# Patient Record
Sex: FEMALE | Race: WHITE | NOT HISPANIC OR LATINO | URBAN - METROPOLITAN AREA
[De-identification: names, ages, dates, MRNs, and addresses within clinical notes are randomized per-mention and may not be internally consistent; named-entity substitution may affect disease eponyms.]

---

## 2017-09-28 ENCOUNTER — AMBULATORY - HEALTHEAST (OUTPATIENT)
Dept: ADMINISTRATIVE | Facility: CLINIC | Age: 82
End: 2017-09-28

## 2017-09-28 RX ORDER — POLYETHYLENE GLYCOL 3350 17 G/17G
17 POWDER, FOR SOLUTION ORAL DAILY PRN
Status: SHIPPED | COMMUNITY
Start: 2017-09-28

## 2017-09-28 RX ORDER — METHADONE HYDROCHLORIDE 5 MG/1
2.5 TABLET ORAL 3 TIMES DAILY
Status: SHIPPED | COMMUNITY
Start: 2017-09-28

## 2017-09-28 RX ORDER — AMOXICILLIN 250 MG
2 CAPSULE ORAL 2 TIMES DAILY
Status: SHIPPED | COMMUNITY
Start: 2017-09-28

## 2017-09-28 RX ORDER — OXYCODONE HYDROCHLORIDE 10 MG/1
10 TABLET ORAL 2 TIMES DAILY
Status: SHIPPED | COMMUNITY
Start: 2017-09-28

## 2017-09-28 RX ORDER — DIVALPROEX SODIUM 125 MG/1
375 CAPSULE, COATED PELLETS ORAL 2 TIMES DAILY
Status: SHIPPED | COMMUNITY
Start: 2017-09-28

## 2017-09-28 RX ORDER — QUETIAPINE FUMARATE 50 MG/1
50 TABLET, FILM COATED ORAL 3 TIMES DAILY
Status: SHIPPED | COMMUNITY
Start: 2017-09-28

## 2017-09-28 RX ORDER — OXYCODONE HYDROCHLORIDE 10 MG/1
10 TABLET ORAL
Status: SHIPPED | COMMUNITY
Start: 2017-09-28

## 2017-09-28 RX ORDER — BISACODYL 10 MG
10 SUPPOSITORY, RECTAL RECTAL
Status: SHIPPED | COMMUNITY
Start: 2017-09-28

## 2017-09-28 RX ORDER — PAROXETINE 30 MG/1
30 TABLET, FILM COATED ORAL DAILY
Status: SHIPPED | COMMUNITY
Start: 2017-09-28

## 2017-09-29 ENCOUNTER — OFFICE VISIT - HEALTHEAST (OUTPATIENT)
Dept: GERIATRICS | Facility: CLINIC | Age: 82
End: 2017-09-29

## 2017-09-29 DIAGNOSIS — M06.9 RA (RHEUMATOID ARTHRITIS) (H): ICD-10-CM

## 2017-09-29 DIAGNOSIS — F03.90 DEMENTIA (H): ICD-10-CM

## 2017-09-29 DIAGNOSIS — E03.9 HYPOTHYROIDISM: ICD-10-CM

## 2017-09-29 DIAGNOSIS — M19.90 DJD (DEGENERATIVE JOINT DISEASE): ICD-10-CM

## 2017-09-29 DIAGNOSIS — I10 ESSENTIAL HYPERTENSION: ICD-10-CM

## 2017-10-23 ENCOUNTER — OFFICE VISIT - HEALTHEAST (OUTPATIENT)
Dept: GERIATRICS | Facility: CLINIC | Age: 82
End: 2017-10-23

## 2017-10-23 DIAGNOSIS — M19.90 DJD (DEGENERATIVE JOINT DISEASE): ICD-10-CM

## 2017-10-23 DIAGNOSIS — I10 ESSENTIAL HYPERTENSION: ICD-10-CM

## 2017-10-23 DIAGNOSIS — F03.90 DEMENTIA (H): ICD-10-CM

## 2017-10-23 DIAGNOSIS — E03.9 HYPOTHYROIDISM: ICD-10-CM

## 2017-10-23 DIAGNOSIS — M06.9 RA (RHEUMATOID ARTHRITIS) (H): ICD-10-CM

## 2017-11-24 ENCOUNTER — OFFICE VISIT - HEALTHEAST (OUTPATIENT)
Dept: GERIATRICS | Facility: CLINIC | Age: 82
End: 2017-11-24

## 2017-11-24 DIAGNOSIS — M19.90 DJD (DEGENERATIVE JOINT DISEASE): ICD-10-CM

## 2017-11-24 DIAGNOSIS — I10 ESSENTIAL HYPERTENSION: ICD-10-CM

## 2017-11-24 DIAGNOSIS — F03.90 DEMENTIA (H): ICD-10-CM

## 2017-11-24 DIAGNOSIS — E03.9 HYPOTHYROIDISM: ICD-10-CM

## 2017-12-04 ENCOUNTER — OFFICE VISIT - HEALTHEAST (OUTPATIENT)
Dept: GERIATRICS | Facility: CLINIC | Age: 82
End: 2017-12-04

## 2017-12-04 DIAGNOSIS — I10 ESSENTIAL HYPERTENSION: ICD-10-CM

## 2017-12-04 DIAGNOSIS — E03.9 HYPOTHYROIDISM: ICD-10-CM

## 2017-12-04 DIAGNOSIS — M06.9 RA (RHEUMATOID ARTHRITIS) (H): ICD-10-CM

## 2017-12-04 DIAGNOSIS — F03.90 DEMENTIA (H): ICD-10-CM

## 2018-01-10 ENCOUNTER — OFFICE VISIT - HEALTHEAST (OUTPATIENT)
Dept: GERIATRICS | Facility: CLINIC | Age: 83
End: 2018-01-10

## 2018-01-10 DIAGNOSIS — F03.90 DEMENTIA (H): ICD-10-CM

## 2018-01-10 DIAGNOSIS — I10 ESSENTIAL HYPERTENSION: ICD-10-CM

## 2018-01-10 DIAGNOSIS — E03.9 HYPOTHYROIDISM: ICD-10-CM

## 2018-01-10 DIAGNOSIS — M06.9 RA (RHEUMATOID ARTHRITIS) (H): ICD-10-CM

## 2018-01-10 DIAGNOSIS — M19.90 DJD (DEGENERATIVE JOINT DISEASE): ICD-10-CM

## 2018-01-16 ENCOUNTER — RECORDS - HEALTHEAST (OUTPATIENT)
Dept: LAB | Facility: CLINIC | Age: 83
End: 2018-01-16

## 2018-01-16 LAB
CREAT SERPL-MCNC: 0.69 MG/DL (ref 0.6–1.1)
GFR SERPL CREATININE-BSD FRML MDRD: >60 ML/MIN/1.73M2

## 2018-03-01 ENCOUNTER — RECORDS - HEALTHEAST (OUTPATIENT)
Dept: LAB | Facility: CLINIC | Age: 83
End: 2018-03-01

## 2018-03-02 LAB
BASOPHILS # BLD AUTO: 0 THOU/UL (ref 0–0.2)
BASOPHILS NFR BLD AUTO: 0 % (ref 0–2)
EOSINOPHIL # BLD AUTO: 0.7 THOU/UL (ref 0–0.4)
EOSINOPHIL NFR BLD AUTO: 8 % (ref 0–6)
ERYTHROCYTE [DISTWIDTH] IN BLOOD BY AUTOMATED COUNT: 12.2 % (ref 11–14.5)
HCT VFR BLD AUTO: 35.9 % (ref 35–47)
HGB BLD-MCNC: 11.8 G/DL (ref 12–16)
LYMPHOCYTES # BLD AUTO: 2.7 THOU/UL (ref 0.8–4.4)
LYMPHOCYTES NFR BLD AUTO: 29 % (ref 20–40)
MCH RBC QN AUTO: 35.3 PG (ref 27–34)
MCHC RBC AUTO-ENTMCNC: 32.9 G/DL (ref 32–36)
MCV RBC AUTO: 108 FL (ref 80–100)
MONOCYTES # BLD AUTO: 0.8 THOU/UL (ref 0–0.9)
MONOCYTES NFR BLD AUTO: 9 % (ref 2–10)
NEUTROPHILS # BLD AUTO: 5.1 THOU/UL (ref 2–7.7)
NEUTROPHILS NFR BLD AUTO: 55 % (ref 50–70)
PLATELET # BLD AUTO: 203 THOU/UL (ref 140–440)
PMV BLD AUTO: 10 FL (ref 8.5–12.5)
RBC # BLD AUTO: 3.34 MILL/UL (ref 3.8–5.4)
WBC: 9.4 THOU/UL (ref 4–11)

## 2018-03-15 ENCOUNTER — AMBULATORY - HEALTHEAST (OUTPATIENT)
Dept: GERIATRICS | Facility: CLINIC | Age: 83
End: 2018-03-15

## 2018-03-30 ENCOUNTER — OFFICE VISIT - HEALTHEAST (OUTPATIENT)
Dept: GERIATRICS | Facility: CLINIC | Age: 83
End: 2018-03-30

## 2018-03-30 DIAGNOSIS — E03.9 HYPOTHYROIDISM: ICD-10-CM

## 2018-03-30 DIAGNOSIS — I10 ESSENTIAL HYPERTENSION: ICD-10-CM

## 2018-03-30 DIAGNOSIS — M19.90 DJD (DEGENERATIVE JOINT DISEASE): ICD-10-CM

## 2018-03-30 DIAGNOSIS — F03.90 DEMENTIA (H): ICD-10-CM

## 2018-05-02 ENCOUNTER — OFFICE VISIT - HEALTHEAST (OUTPATIENT)
Dept: GERIATRICS | Facility: CLINIC | Age: 83
End: 2018-05-02

## 2018-05-02 DIAGNOSIS — F03.90 DEMENTIA (H): ICD-10-CM

## 2018-05-02 DIAGNOSIS — M06.9 RA (RHEUMATOID ARTHRITIS) (H): ICD-10-CM

## 2018-05-02 DIAGNOSIS — E03.9 HYPOTHYROIDISM: ICD-10-CM

## 2018-05-02 DIAGNOSIS — I10 ESSENTIAL HYPERTENSION: ICD-10-CM

## 2018-05-02 DIAGNOSIS — M19.90 DJD (DEGENERATIVE JOINT DISEASE): ICD-10-CM

## 2018-05-24 ENCOUNTER — RECORDS - HEALTHEAST (OUTPATIENT)
Dept: LAB | Facility: CLINIC | Age: 83
End: 2018-05-24

## 2018-05-25 LAB — SODIUM SERPL-SCNC: 138 MMOL/L (ref 136–145)

## 2018-06-10 ENCOUNTER — COMMUNICATION - HEALTHEAST (OUTPATIENT)
Dept: GERIATRICS | Facility: CLINIC | Age: 83
End: 2018-06-10

## 2018-06-10 DIAGNOSIS — R52 PAIN: ICD-10-CM

## 2018-07-02 ENCOUNTER — OFFICE VISIT - HEALTHEAST (OUTPATIENT)
Dept: GERIATRICS | Facility: CLINIC | Age: 83
End: 2018-07-02

## 2018-07-02 DIAGNOSIS — M06.9 RA (RHEUMATOID ARTHRITIS) (H): ICD-10-CM

## 2018-07-02 DIAGNOSIS — I10 ESSENTIAL HYPERTENSION: ICD-10-CM

## 2018-07-02 DIAGNOSIS — F03.90 DEMENTIA (H): ICD-10-CM

## 2018-07-02 DIAGNOSIS — S00.01XA SCALP ABRASION: ICD-10-CM

## 2018-07-02 DIAGNOSIS — M19.90 DJD (DEGENERATIVE JOINT DISEASE): ICD-10-CM

## 2018-07-31 ENCOUNTER — OFFICE VISIT - HEALTHEAST (OUTPATIENT)
Dept: GERIATRICS | Facility: CLINIC | Age: 83
End: 2018-07-31

## 2018-07-31 DIAGNOSIS — I10 ESSENTIAL HYPERTENSION: ICD-10-CM

## 2018-07-31 DIAGNOSIS — M19.90 DJD (DEGENERATIVE JOINT DISEASE): ICD-10-CM

## 2018-07-31 DIAGNOSIS — F03.90 DEMENTIA (H): ICD-10-CM

## 2018-07-31 DIAGNOSIS — E03.9 HYPOTHYROIDISM: ICD-10-CM

## 2018-08-21 ENCOUNTER — COMMUNICATION - HEALTHEAST (OUTPATIENT)
Dept: GERIATRICS | Facility: CLINIC | Age: 83
End: 2018-08-21

## 2018-08-22 ENCOUNTER — OFFICE VISIT - HEALTHEAST (OUTPATIENT)
Dept: GERIATRICS | Facility: CLINIC | Age: 83
End: 2018-08-22

## 2018-08-22 DIAGNOSIS — M06.9 RA (RHEUMATOID ARTHRITIS) (H): ICD-10-CM

## 2018-08-22 DIAGNOSIS — F03.90 DEMENTIA (H): ICD-10-CM

## 2018-08-22 DIAGNOSIS — Z48.02 REMOVAL OF STAPLE: ICD-10-CM

## 2018-08-22 DIAGNOSIS — I10 ESSENTIAL HYPERTENSION: ICD-10-CM

## 2018-08-27 ENCOUNTER — COMMUNICATION - HEALTHEAST (OUTPATIENT)
Dept: GERIATRICS | Facility: CLINIC | Age: 83
End: 2018-08-27

## 2018-09-16 ENCOUNTER — COMMUNICATION - HEALTHEAST (OUTPATIENT)
Dept: GERIATRICS | Facility: CLINIC | Age: 83
End: 2018-09-16

## 2018-10-03 ENCOUNTER — OFFICE VISIT - HEALTHEAST (OUTPATIENT)
Dept: GERIATRICS | Facility: CLINIC | Age: 83
End: 2018-10-03

## 2018-10-03 DIAGNOSIS — F03.90 DEMENTIA (H): ICD-10-CM

## 2018-10-03 DIAGNOSIS — E03.9 HYPOTHYROIDISM: ICD-10-CM

## 2018-10-03 DIAGNOSIS — I10 ESSENTIAL HYPERTENSION: ICD-10-CM

## 2018-10-03 DIAGNOSIS — M06.9 RA (RHEUMATOID ARTHRITIS) (H): ICD-10-CM

## 2018-10-05 ENCOUNTER — RECORDS - HEALTHEAST (OUTPATIENT)
Dept: LAB | Facility: CLINIC | Age: 83
End: 2018-10-05

## 2018-10-05 ENCOUNTER — COMMUNICATION - HEALTHEAST (OUTPATIENT)
Dept: GERIATRICS | Facility: CLINIC | Age: 83
End: 2018-10-05

## 2018-10-05 LAB — TSH SERPL DL<=0.005 MIU/L-ACNC: 0.24 UIU/ML (ref 0.3–5)

## 2018-10-06 RX ORDER — LEVOTHYROXINE SODIUM 50 UG/1
50 TABLET ORAL DAILY
Status: SHIPPED | COMMUNITY
Start: 2018-10-06

## 2018-10-17 ENCOUNTER — COMMUNICATION - HEALTHEAST (OUTPATIENT)
Dept: GERIATRICS | Facility: CLINIC | Age: 83
End: 2018-10-17

## 2018-10-23 ENCOUNTER — AMBULATORY - HEALTHEAST (OUTPATIENT)
Dept: GERIATRICS | Facility: CLINIC | Age: 83
End: 2018-10-23

## 2021-05-31 VITALS — WEIGHT: 115 LBS

## 2021-05-31 VITALS — WEIGHT: 114.5 LBS

## 2021-06-01 VITALS — WEIGHT: 117 LBS

## 2021-06-01 VITALS — WEIGHT: 118 LBS

## 2021-06-02 VITALS — WEIGHT: 110 LBS

## 2021-06-13 NOTE — PROGRESS NOTES
[facility]      Facility:    [facility] [[id number]]  Code Status: DNR/DNI       Chief Complaint/Reason for Visit:  Chief Complaint   Patient presents with     H & P     New LTC admit for  MCS. (H & P 9/29/17).       HPI:   Bobbi is a 89 y.o. female resident in LTC at [facility] seen as new admission to  MCS service. She has been a patient here for a long time, previously followed by different provider team. Chart was reviewed, I spoke with nursing staff, interviewed and examined patient. History below. Unable to obtain any meaningful history from patient due to dementia. No particular new concerns per nursing staff. Of note she was on Hospice approximately Oct 2016 until fairly recently just signed off.       Past Medical History:  Past Medical History:   Diagnosis Date     Alzheimer's disease      Blepharitis      Cataract      COPD (chronic obstructive pulmonary disease)      Dermatitis      Diverticulosis      Glaucoma      Hearing loss      Hip fracture, right      HTN (hypertension)      Hypothyroidism      Malignant neoplasm of skin      Osteoporosis      Pulmonary hypertension      Vitamin D deficiency            Surgical History:  Past Surgical History:   Procedure Laterality Date     ORIF HIP FRACTURE Right      TOTAL HIP ARTHROPLASTY Left        Family History:   Family History   Problem Relation Age of Onset     Heart disease Father      Diabetes Sister      Hypertension Brother      MI, CVA       Social History:    Social History     Social History     Marital status: Single     Spouse name: N/A     Number of children: N/A     Years of education: N/A     Social History Main Topics     Smoking status: Former Smoker     Smokeless tobacco: Never Used     Alcohol use No     Drug use: No     Sexual activity: Not on file     Other Topics Concern     Not on file     Social History Narrative     No narrative on file        Medications:  Current  Outpatient Prescriptions   Medication Sig     acetaminophen (TYLENOL) 500 MG tablet Take 1,000 mg by mouth 3 (three) times a day.     bisacodyl (DULCOLAX, BISACODYL,) 10 mg suppository Insert 10 mg into the rectum. Every 3 days as needed     divalproex (DEPAKOTE SPRINKLES) 125 mg capsule Take 375 mg by mouth 2 (two) times a day.     hydrocortisone (ANUSOL-HC) 2.5 % rectal cream Insert 1 application into the rectum 2 (two) times a day as needed for hemorrhoids.     levothyroxine (SYNTHROID, LEVOTHROID) 75 MCG tablet Take 75 mcg by mouth daily.     lidocaine (XYLOCAINE) 2 % jelly Apply 1 application topically 2 (two) times a day as needed.     methadone (DOLOPHINE) 5 MG tablet Take 2.5 mg by mouth 3 (three) times a day.     oxyCODONE (ROXICODONE) 10 mg immediate release tablet Take 10 mg by mouth every hour as needed for pain.     oxyCODONE (ROXICODONE) 10 mg immediate release tablet Take 10 mg by mouth 2 (two) times a day.     PARoxetine (PAXIL) 30 MG tablet Take 30 mg by mouth daily.     polyethylene glycol (MIRALAX) 17 gram packet Take 17 g by mouth daily as needed.     QUEtiapine (SEROQUEL) 50 MG tablet Take 50 mg by mouth 3 (three) times a day.     senna-docusate (SENNOSIDES-DOCUSATE SODIUM) 8.6-50 mg tablet Take 2 tablets by mouth daily.     zinc oxide (DESITIN) 13 % Crea Apply 1 application topically.       Allergies:  Allergies   Allergen Reactions     Ace Inhibitors        Review of Systems:  Pertinent items are noted in HPI.   Unable to obtain secondary to dementia.      Physical Exam:   General: Patient is alert female, up in todd chair, talks to self.  Vitals: /78, Temp 98.8, Pulse 68, RR 20, O2 sat 92% RA.  HEENT: Head is NCAT. Eyes show no injection or icterus. Nares negative. Oropharynx well hydrated.  Neck: Supple. No tenderness or adenopathy. No JVD.  Lungs: Clear bilaterally. No wheezes. Poor cooperation.  Cardiovascular: Regular rate and rhythm, normal S1, S2. Poss soft systolic murmur.  Back:  No spinal or CVA tenderness.  Abdomen: Soft, no tenderness on exam. Bowel sounds present. No guarding rebound or rigidity.  : Deferred.  Extremities: No edema is noted.  Musculoskeletal: Age related degen changes.   Skin: No open areas.   Psych: Mood appears good.      Assessment/Plan:  1. Dementia. With psychosis and delusions. Cont current meds.  2. HTN. BPs satisfactory. Not on BP meds.   3. Hypothyroidism. Cont replacement.  4. DJD. General debility. Cont pain meds. Reassess as needed.   5. RA. Noted per chart, not on meds.   6. Code status is DNR/DNI.      Total time greater than 50 minutes, greater than 50% counseling and coordination of care, time spent in interview and examination of patient, review of records, discussion with nursing staff.        Electronically signed by: Karley Schmidt MD

## 2021-06-13 NOTE — PROGRESS NOTES
[facility]    Facility:   [facility] [[id number]]   Code Status: DNR      CHIEF COMPLAINT/REASON FOR VISIT:  Chief Complaint   Patient presents with     Review Of Multiple Medical Conditions     first routine visit       HISTORY:      HPI: Bobbi is a 89 y.o. female resident in LTC at [facility] seen as a routine visit. She is new to . . She has been a patient here for a long time, previously followed by different provider team. Chart was reviewed,Her TSH is up to date. No concerns per nursing staff. She does have a HX of being on hospice and it appears she had recently signed off. She denied CP or SOB. She had just come back from the [employer].     Past Medical History:   Diagnosis Date     Alzheimer's disease      Blepharitis      Cataract      COPD (chronic obstructive pulmonary disease)      Dermatitis      Diverticulosis      Glaucoma      Hearing loss      Hip fracture, right      HTN (hypertension)      Hypothyroidism      Malignant neoplasm of skin      Osteoporosis      Pulmonary hypertension      Vitamin D deficiency              Family History   Problem Relation Age of Onset     Heart disease Father      Diabetes Sister      Hypertension Brother      MI, CVA     Social History     Social History     Marital status: Single     Spouse name: N/A     Number of children: N/A     Years of education: N/A     Social History Main Topics     Smoking status: Former Smoker     Smokeless tobacco: Never Used     Alcohol use No     Drug use: No     Sexual activity: Not on file     Other Topics Concern     Not on file     Social History Narrative     No narrative on file     Obtained from staff and chart. Pt with dementia    Review of Systems   Constitutional: Negative for appetite change, chills, fatigue and fever.   Respiratory: Negative for cough, shortness of breath and wheezing.    Cardiovascular: Negative for chest pain and leg swelling.    Gastrointestinal: Negative for abdominal distention, constipation, diarrhea and nausea.   Genitourinary: Negative for dysuria.   Musculoskeletal: Negative for arthralgias and myalgias.   Skin: Negative for color change, rash and wound.   Psychiatric/Behavioral: Negative for sleep disturbance.       .  Vitals:    10/23/17 0922   BP: 118/67   Pulse: 66   Resp: 18   Temp: 97.7  F (36.5  C)   SpO2: 92%   Weight: 114 lb 8 oz (51.9 kg)       Physical Exam   Constitutional: She appears well-developed.   Confused woman in no acute distress. Just came back from the [employer] shop   HENT:   Head: Normocephalic.   Eyes: Conjunctivae are normal.   Neck: Normal range of motion.   Cardiovascular: Normal rate, regular rhythm and normal heart sounds.    No murmur heard.  Pulmonary/Chest: Breath sounds normal. No respiratory distress. She has no wheezes. She has no rales.   Abdominal: Soft. Bowel sounds are normal. She exhibits no distension. There is no tenderness.   Musculoskeletal: Normal range of motion. She exhibits no edema.   Neurological: She is alert.   Skin: Skin is warm and dry.   Psychiatric: She has a normal mood and affect. Her behavior is normal.         LABS:     10/11/17-TSH-1.91    Current Outpatient Prescriptions   Medication Sig     acetaminophen (TYLENOL) 500 MG tablet Take 1,000 mg by mouth 3 (three) times a day.     bisacodyl (DULCOLAX, BISACODYL,) 10 mg suppository Insert 10 mg into the rectum. Every 3 days as needed     divalproex (DEPAKOTE SPRINKLES) 125 mg capsule Take 375 mg by mouth 2 (two) times a day.     hydrocortisone (ANUSOL-HC) 2.5 % rectal cream Insert 1 application into the rectum 2 (two) times a day as needed for hemorrhoids.     levothyroxine (SYNTHROID, LEVOTHROID) 75 MCG tablet Take 75 mcg by mouth daily.     lidocaine (XYLOCAINE) 2 % jelly Apply 1 application topically 2 (two) times a day as needed.     methadone (DOLOPHINE) 5 MG tablet Take 2.5 mg by mouth 3 (three) times a day.      oxyCODONE (ROXICODONE) 10 mg immediate release tablet Take 10 mg by mouth every hour as needed for pain.     oxyCODONE (ROXICODONE) 10 mg immediate release tablet Take 10 mg by mouth 2 (two) times a day.     PARoxetine (PAXIL) 30 MG tablet Take 30 mg by mouth daily.     polyethylene glycol (MIRALAX) 17 gram packet Take 17 g by mouth daily as needed.     QUEtiapine (SEROQUEL) 50 MG tablet Take 50 mg by mouth 3 (three) times a day.     senna-docusate (SENNOSIDES-DOCUSATE SODIUM) 8.6-50 mg tablet Take 2 tablets by mouth daily.     UNABLE TO FIND Med Name:steri lid foam (eyelid cleanser)chayito ly to both eyes topically as needed for eye irritation related to blepharitis     zinc oxide (DESITIN) 13 % Crea Apply 1 application topically.       ASSESSMENT:      ICD-10-CM    1. RA (rheumatoid arthritis) M06.9    2. Hypothyroidism E03.9    3. Essential hypertension I10    4. Dementia F03.90    5. DJD (degenerative joint disease) M19.90        PLAN:    Dementia. with psychosis and delusions. Cont current meds, on Paxil, Depakote and Seroquel  HTN. BP's stable. Not on BP meds.   Hypothyroidism. Cont levothyroxine Last TSH 1.91 on 10/11/17.    Electronically signed by: Ijeoma Chang CNP

## 2021-06-14 NOTE — PROGRESS NOTES
[facility]    Facility:   [facility] [[id number]]   Code Status: DNR/DNI       Chief Complaint   Patient presents with     Review Of Multiple Medical Conditions     LT 11/24/17.       HPI:   Bobbi is a 89 y.o. female resident in LTC at [facility] seen for routine physician follow up. She has significant dementia, non communicative. Hx of paranoia and psychosis, on behavioral meds. She also has COPD, HTN, hypothyroidism, DJD.    No interim concerns since my last visit. She is relatively new to our service. No concerns from nursing staff who know her well. She has not been sick recently with cough cold or congestion. No fever or illness. Appetite per usual. No change in bowel or bladder habits. She requires total care and assist from staff.      Past Medical History:  Past Medical History:   Diagnosis Date     Alzheimer's disease      Blepharitis      Cataract      COPD (chronic obstructive pulmonary disease)      Dermatitis      Diverticulosis      Glaucoma      Hearing loss      Hip fracture, right      HTN (hypertension)      Hypothyroidism      Malignant neoplasm of skin      Osteoporosis      Pulmonary hypertension      Vitamin D deficiency        Medications:  Current Outpatient Prescriptions   Medication Sig     acetaminophen (TYLENOL) 500 MG tablet Take 1,000 mg by mouth 3 (three) times a day.     bisacodyl (DULCOLAX, BISACODYL,) 10 mg suppository Insert 10 mg into the rectum. Every 3 days as needed     divalproex (DEPAKOTE SPRINKLES) 125 mg capsule Take 375 mg by mouth 2 (two) times a day.     hydrocortisone (ANUSOL-HC) 2.5 % rectal cream Insert 1 application into the rectum 2 (two) times a day as needed for hemorrhoids.     levothyroxine (SYNTHROID, LEVOTHROID) 75 MCG tablet Take 75 mcg by mouth daily.     lidocaine (XYLOCAINE) 2 % jelly Apply 1 application topically 2 (two) times a day as needed.     methadone (DOLOPHINE) 5 MG tablet Take 2.5  mg by mouth 3 (three) times a day.     oxyCODONE (ROXICODONE) 10 mg immediate release tablet Take 10 mg by mouth every hour as needed for pain.     oxyCODONE (ROXICODONE) 10 mg immediate release tablet Take 10 mg by mouth 2 (two) times a day.     PARoxetine (PAXIL) 30 MG tablet Take 30 mg by mouth daily.     polyethylene glycol (MIRALAX) 17 gram packet Take 17 g by mouth daily as needed.     QUEtiapine (SEROQUEL) 50 MG tablet Take 50 mg by mouth 3 (three) times a day.     senna-docusate (SENNOSIDES-DOCUSATE SODIUM) 8.6-50 mg tablet Take 2 tablets by mouth daily.     UNABLE TO FIND Med Name:steri lid foam (eyelid cleanser)chayito ly to both eyes topically as needed for eye irritation related to blepharitis     zinc oxide (DESITIN) 13 % Crea Apply 1 application topically.       Physical Exam:   General: Patient is alert female, up in todd chair, talks to self.  Vitals: Reviewed.  HEENT: Head is NCAT. Eyes show no injection or icterus. Nares negative. Oropharynx well hydrated.  Neck: Supple. No tenderness or adenopathy. No JVD.  Lungs: Clear bilaterally. No wheezes. Poor cooperation.  Cardiovascular: Regular rate and rhythm, normal S1, S2. Poss soft systolic murmur.  Abdomen: Soft, no tenderness on exam. Bowel sounds present. No guarding rebound or rigidity.  Extremities: No edema is noted.  Musculoskeletal: Age related degen changes.   Skin: No open areas.       Labs:  Lab Results   Component Value Date    TSH 1.91 10/11/2017       Assessment/Plan:  1. Dementia. With psychosis and delusions. She is on Seroquel.  2. HTN. BPs controlled. Not on BP meds.   3. Hypothyroidism. Continue replacement. Last TSH in goal range.  4. DJD. General debility. She is on scheduled methadone and tylenol.     Overall she appears stable, no new orders today.      Electronically signed by: Karley Schmidt MD

## 2021-06-14 NOTE — PROGRESS NOTES
[facility]    Facility:   [facility] [[id number]]   Code Status: DNR      CHIEF COMPLAINT/REASON FOR VISIT:  Chief Complaint   Patient presents with     Review Of Multiple Medical Conditions     routine visit       HISTORY:      HPI: Bobbi is a 89 y.o. female resident in LTC at [facility] seen as a routine visit. She is new to .. She has been a patient here for a long time, previously followed by different provider team. Chart was reviewed,Her TSH is up to date. No concerns per nursing staff. She does have a HX of being on hospice and it appears she had recently signed off.  She was non verbal with me this visit and found to have chocolate stuck in the corners of her mouth. At first I thought it was blood, It was difficult to wash off completely because she fights it. I spoke with staff and they reported they work on cleaning her mouth throughout the day and use vaseline on her lips. They also reported it is a struggle to brush her teeth and they have to do it in phases throughout  the day. 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 Weights have been stable over the last month.    Past Medical History:   Diagnosis Date     Alzheimer's disease      Blepharitis      Cataract      COPD (chronic obstructive pulmonary disease)      Dermatitis      Diverticulosis      Glaucoma      Hearing loss      Hip fracture, right      HTN (hypertension)      Hypothyroidism      Malignant neoplasm of skin      Osteoporosis      Pulmonary hypertension      Vitamin D deficiency              Family History   Problem Relation Age of Onset     Heart disease Father      Diabetes Sister      Hypertension Brother      MI, CVA     Social History     Social History     Marital status: Single     Spouse name: N/A     Number of children: N/A     Years of education: N/A     Social History Main Topics     Smoking status: Former Smoker     Smokeless tobacco: Never Used     Alcohol use No     Drug  use: No     Sexual activity: Not on file     Other Topics Concern     Not on file     Social History Narrative     No narrative on file     Obtained from staff and chart. Pt with dementia    Review of Systems   Constitutional: Negative for appetite change, chills, fatigue and fever.   Respiratory: Negative for cough, shortness of breath and wheezing.    Cardiovascular: Negative for chest pain and leg swelling.   Gastrointestinal: Negative for abdominal distention, constipation, diarrhea and nausea.   Genitourinary: Negative for dysuria.   Musculoskeletal: Negative for arthralgias and myalgias.   Skin: Negative for color change, rash and wound.   Psychiatric/Behavioral: Negative for sleep disturbance.       .  Vitals:    12/04/17 0850   BP: 118/68   Pulse: 68   Resp: 20   Temp: 97.6  F (36.4  C)   Weight: 115 lb (52.2 kg)       Physical Exam   Constitutional: She appears well-developed.   Confused woman in no acute distress. Refusing to let me fully wipe the chocolate off her mouth.   HENT:   Head: Normocephalic.   Eyes: Conjunctivae are normal.   Neck: Normal range of motion.   Cardiovascular: Normal rate, regular rhythm and normal heart sounds.    No murmur heard.  Pulmonary/Chest: Breath sounds normal. No respiratory distress. She has no wheezes. She has no rales.   Abdominal: Soft. Bowel sounds are normal. She exhibits no distension. There is no tenderness.   Musculoskeletal: Normal range of motion. She exhibits no edema.   Neurological: She is alert.   Skin: Skin is warm and dry.   Psychiatric: She has a normal mood and affect. Her behavior is normal.         LABS:     10/11/17-TSH-1.91    Current Outpatient Prescriptions   Medication Sig     acetaminophen (TYLENOL) 500 MG tablet Take 1,000 mg by mouth 3 (three) times a day.     bisacodyl (DULCOLAX, BISACODYL,) 10 mg suppository Insert 10 mg into the rectum. Every 3 days as needed     divalproex (DEPAKOTE SPRINKLES) 125 mg capsule Take 375 mg by mouth 2 (two)  times a day.     hydrocortisone (ANUSOL-HC) 2.5 % rectal cream Insert 1 application into the rectum 2 (two) times a day as needed for hemorrhoids.     levothyroxine (SYNTHROID, LEVOTHROID) 75 MCG tablet Take 75 mcg by mouth daily.     lidocaine (XYLOCAINE) 2 % jelly Apply 1 application topically 2 (two) times a day as needed.     methadone (DOLOPHINE) 5 MG tablet Take 2.5 mg by mouth 3 (three) times a day.     oxyCODONE (ROXICODONE) 10 mg immediate release tablet Take 10 mg by mouth every hour as needed for pain.     oxyCODONE (ROXICODONE) 10 mg immediate release tablet Take 10 mg by mouth 2 (two) times a day.     PARoxetine (PAXIL) 30 MG tablet Take 30 mg by mouth daily.     polyethylene glycol (MIRALAX) 17 gram packet Take 17 g by mouth daily as needed.     QUEtiapine (SEROQUEL) 50 MG tablet Take 50 mg by mouth 3 (three) times a day.     senna-docusate (SENNOSIDES-DOCUSATE SODIUM) 8.6-50 mg tablet Take 2 tablets by mouth daily.     UNABLE TO FIND Med Name:steri lid foam (eyelid cleanser)chayito ly to both eyes topically as needed for eye irritation related to blepharitis     zinc oxide (DESITIN) 13 % Crea Apply 1 application topically.       ASSESSMENT:      ICD-10-CM    1. RA (rheumatoid arthritis) M06.9    2. Hypothyroidism E03.9    3. Essential hypertension I10    4. Dementia F03.90        PLAN:    Dementia. with psychosis and delusions. Cont current meds, on Paxil, Depakote and Seroquel  HTN. BP's stable. Not on BP meds.   Hypothyroidism. Cont levothyroxine Last TSH 1.91 on 10/11/17.    Electronically signed by: Ijeoma Chang CNP

## 2021-06-15 NOTE — PROGRESS NOTES
[facility]    Facility:   [facility] [[id number]]   Code Status: DNR      CHIEF COMPLAINT/REASON FOR VISIT:  Chief Complaint   Patient presents with     Review Of Multiple Medical Conditions       HISTORY:      HPI: Bobbi is a 89 y.o. female resident in LTC at [facility] seen as a routine visit. She is fairly  new to . She has been a patient here for a long time, previously followed by different provider team. Chart was reviewed, Her  Labs were reviewed and her TSH is up to date last being 1.91 on 10/17/17.. No concerns per nursing staff. She does have a HX of being on hospice but  Is no longer.  She was non verbal during my visit. 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 Weights have been stable over the last month.  Nursing report needing to use the suppositories more frequently. Her bowel medications were increased. SHe does get gunky eyes and has foam pads daily for cleaning. Staff tell me she fights the pads but will allow warm wash cloths. Her medications were reviewed and her lidocaine gel and eye foam pads were dc'd. 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 Her weights have been stable over the last month.     Past Medical History:   Diagnosis Date     Alzheimer's disease      Blepharitis      Cataract      COPD (chronic obstructive pulmonary disease)      Dermatitis      Diverticulosis      Glaucoma      Hearing loss      Hip fracture, right      HTN (hypertension)      Hypothyroidism      Malignant neoplasm of skin      Osteoporosis      Pulmonary hypertension      Vitamin D deficiency              Family History   Problem Relation Age of Onset     Heart disease Father      Diabetes Sister      Hypertension Brother      MI, CVA     Social History     Social History     Marital status: Single     Spouse name: N/A     Number of children: N/A     Years of education: N/A     Social History Main Topics     Smoking status: Former  Smoker     Smokeless tobacco: Never Used     Alcohol use No     Drug use: No     Sexual activity: Not on file     Other Topics Concern     Not on file     Social History Narrative     No narrative on file     Obtained from staff and chart. Pt with dementia    Review of Systems   Constitutional: Negative for appetite change, chills, fatigue and fever.   Respiratory: Negative for cough, shortness of breath and wheezing.    Cardiovascular: Negative for chest pain and leg swelling.   Gastrointestinal: Positive for constipation. Negative for abdominal distention, diarrhea and nausea.   Genitourinary: Negative for dysuria.   Musculoskeletal: Negative for arthralgias and myalgias.   Skin: Negative for color change, rash and wound.   Psychiatric/Behavioral: Negative for sleep disturbance.       .  Vitals:    01/10/18 0916   BP: 118/65   Pulse: 66   Resp: 18   Temp: 97.7  F (36.5  C)   SpO2: 96%   Weight: 115 lb (52.2 kg)       Physical Exam   Constitutional: She appears well-developed.   Woman in n o acute distress. She slept through me listening to her heart and lungs and checking for any edema.     HENT:   Head: Normocephalic.   Eyes: Conjunctivae are normal.   Eyes crusty (chronic) had foam pads but refuses them. New orders for warm lenora cloth cleanings daily.    Neck: Normal range of motion.   Cardiovascular: Normal rate, regular rhythm and normal heart sounds.    No murmur heard.  Pulmonary/Chest: Breath sounds normal. No respiratory distress. She has no wheezes. She has no rales.   Abdominal: Soft. Bowel sounds are normal. She exhibits no distension. There is no tenderness.   Musculoskeletal: Normal range of motion. She exhibits no edema.   Neurological:   Slept through exam.    Skin: Skin is warm and dry.   Psychiatric: She has a normal mood and affect. Her behavior is normal.         LABS:     10/11/17-TSH-1.91    Current Outpatient Prescriptions   Medication Sig     acetaminophen (TYLENOL) 500 MG tablet Take 1,000  mg by mouth 3 (three) times a day.     bisacodyl (DULCOLAX, BISACODYL,) 10 mg suppository Insert 10 mg into the rectum. Every 3 days as needed     divalproex (DEPAKOTE SPRINKLES) 125 mg capsule Take 375 mg by mouth 2 (two) times a day.     hydrocortisone (ANUSOL-HC) 2.5 % rectal cream Insert 1 application into the rectum 2 (two) times a day as needed for hemorrhoids.     levothyroxine (SYNTHROID, LEVOTHROID) 75 MCG tablet Take 75 mcg by mouth daily.     methadone (DOLOPHINE) 5 MG tablet Take 2.5 mg by mouth 3 (three) times a day.     oxyCODONE (ROXICODONE) 10 mg immediate release tablet Take 10 mg by mouth every hour as needed for pain.     oxyCODONE (ROXICODONE) 10 mg immediate release tablet Take 10 mg by mouth 2 (two) times a day.     PARoxetine (PAXIL) 30 MG tablet Take 30 mg by mouth daily.     polyethylene glycol (MIRALAX) 17 gram packet Take 17 g by mouth daily as needed.     QUEtiapine (SEROQUEL) 50 MG tablet Take 50 mg by mouth 3 (three) times a day.     senna-docusate (SENNOSIDES-DOCUSATE SODIUM) 8.6-50 mg tablet Take 2 tablets by mouth 2 (two) times a day.      zinc oxide (DESITIN) 13 % Crea Apply 1 application topically.       ASSESSMENT:      ICD-10-CM    1. RA (rheumatoid arthritis) M06.9    2. Hypothyroidism E03.9    3. Essential hypertension I10    4. Dementia F03.90    5. DJD (degenerative joint disease) M19.90        PLAN:    Dementia. with psychosis and delusions. Cont current meds, on Paxil, Depakote and Seroquel  HTN. BP's stable. Not on BP meds. Last /65 with a pulse of 66  Hypothyroidism. Cont levothyroxine Last TSH 1.91 on 10/11/17.  Daily warm water eye cleaning  Constipation- increase senna-s  to 2 tabs BID  Continue supplements.     Electronically signed by: Ijeoma Chang CNP

## 2021-06-16 PROBLEM — M06.9 RA (RHEUMATOID ARTHRITIS) (H): Status: ACTIVE | Noted: 2017-10-03

## 2021-06-16 PROBLEM — F03.90 DEMENTIA (H): Status: ACTIVE | Noted: 2017-10-03

## 2021-06-16 PROBLEM — E03.9 HYPOTHYROIDISM: Status: ACTIVE | Noted: 2017-10-03

## 2021-06-16 PROBLEM — M19.90 DJD (DEGENERATIVE JOINT DISEASE): Status: ACTIVE | Noted: 2017-10-03

## 2021-06-16 PROBLEM — I10 ESSENTIAL HYPERTENSION: Status: ACTIVE | Noted: 2017-10-03

## 2021-06-17 NOTE — PROGRESS NOTES
"[facility]    Facility:   [facility] [[id number]]   Code Status: DNR      CHIEF COMPLAINT/REASON FOR VISIT:  Chief Complaint   Patient presents with     Review Of Multiple Medical Conditions     routine visit       HISTORY:      HPI: Bobbi is a 89 y.o. female resident in LTC at [facility] seen as a routine visit. . She has been a patient here for a long time. Chart was reviewed, Her  Labs were reviewed and her TSH is up to date last being 1.91 on 10/17/17.. No concerns per nursing staff. She does have a HX of being on hospice but  Is no longer.  She is non communicative most of the time but today she was able to tell me her first name only and kept repeating,\"Your having a good time\". 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 Weights have been stable over the last month.   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 Her weights have been stable over the last month. Her diet was recently changed to a mechanical soft. SHe has her own teeth and is beginning to lose some. She denies CP when asked if anything hurts. No SOB noted or reported.     Past Medical History:   Diagnosis Date     Alzheimer's disease      Blepharitis      Cataract      COPD (chronic obstructive pulmonary disease)      Dermatitis      Diverticulosis      Glaucoma      Hearing loss      Hip fracture, right      HTN (hypertension)      Hypothyroidism      Malignant neoplasm of skin      Osteoporosis      Pulmonary hypertension      Vitamin D deficiency              Family History   Problem Relation Age of Onset     Heart disease Father      Diabetes Sister      Hypertension Brother      MI, CVA     Social History     Social History     Marital status: Single     Spouse name: N/A     Number of children: N/A     Years of education: N/A     Social History Main Topics     Smoking status: Former Smoker     Smokeless tobacco: Never Used     Alcohol use No     Drug use: " No     Sexual activity: Not on file     Other Topics Concern     Not on file     Social History Narrative     No narrative on file     Obtained from staff and chart. Pt with dementia    Review of Systems   Constitutional: Negative for appetite change, chills, fatigue and fever.   Respiratory: Negative for cough, shortness of breath and wheezing.    Cardiovascular: Negative for chest pain and leg swelling.   Gastrointestinal: Positive for constipation. Negative for abdominal distention, diarrhea and nausea.        Doing better with the increase in bowel medications.    Genitourinary: Negative for dysuria.   Musculoskeletal: Negative for arthralgias and myalgias.   Skin: Negative for color change, rash and wound.   Psychiatric/Behavioral: Negative for sleep disturbance.       .  Vitals:    05/02/18 1344   BP: 118/67   Pulse: 70   Resp: 18   Temp: 97.5  F (36.4  C)   SpO2: 95%   Weight: 118 lb (53.5 kg)       Physical Exam   Constitutional: She appears well-developed.   Woman in no acute distress.    HENT:   Head: Normocephalic.   Eyes: Conjunctivae are normal.   Neck: Normal range of motion.   Cardiovascular: Normal rate, regular rhythm and normal heart sounds.    No murmur heard.  Pulmonary/Chest: Breath sounds normal. No respiratory distress. She has no wheezes. She has no rales.   Abdominal: Soft. Bowel sounds are normal. She exhibits no distension. There is no tenderness.   Musculoskeletal: Normal range of motion. She exhibits no edema.   Skin: Skin is warm and dry.   Psychiatric: She has a normal mood and affect. Her behavior is normal.         LABS:     10/11/17-TSH-1.91    Current Outpatient Prescriptions   Medication Sig     bisacodyl (DULCOLAX, BISACODYL,) 10 mg suppository Insert 10 mg into the rectum. Every 3 days as needed     divalproex (DEPAKOTE SPRINKLES) 125 mg capsule Take 375 mg by mouth 2 (two) times a day.     hydrocortisone (ANUSOL-HC) 2.5 % rectal cream Insert 1 application into the rectum 2  (two) times a day as needed for hemorrhoids.     levothyroxine (SYNTHROID, LEVOTHROID) 75 MCG tablet Take 75 mcg by mouth daily.     methadone (DOLOPHINE) 5 MG tablet Take 2.5 mg by mouth 3 (three) times a day.     oxyCODONE (ROXICODONE) 10 mg immediate release tablet Take 10 mg by mouth every hour as needed for pain.     oxyCODONE (ROXICODONE) 10 mg immediate release tablet Take 10 mg by mouth 2 (two) times a day.     PARoxetine (PAXIL) 30 MG tablet Take 30 mg by mouth daily.     polyethylene glycol (MIRALAX) 17 gram packet Take 17 g by mouth daily as needed.     QUEtiapine (SEROQUEL) 50 MG tablet Take 50 mg by mouth 3 (three) times a day.     senna-docusate (SENNOSIDES-DOCUSATE SODIUM) 8.6-50 mg tablet Take 2 tablets by mouth 2 (two) times a day. 2 tablets in the AM and 1 tablet in the evening     zinc oxide (DESITIN) 13 % Crea Apply 1 application topically.       ASSESSMENT:      ICD-10-CM    1. RA (rheumatoid arthritis) M06.9    2. Essential hypertension I10    3. Dementia F03.90    4. DJD (degenerative joint disease) M19.90    5. Hypothyroidism E03.9        PLAN:    Dementia. with psychosis and delusions. Cont current meds, on Paxil, Depakote and Seroquel  HTN. BP's stable. Not on BP meds. Last /67 with a pulse of 70  Hypothyroidism. Cont levothyroxine Last TSH 1.91 on 10/11/17.  Daily warm water eye cleaning  Constipation- resolved with the last increase in bowel medications.       Electronically signed by: Ijeoma Chang, CNP

## 2021-06-17 NOTE — PROGRESS NOTES
[facility]    Facility:   [facility] [[id number]]   Code Status: DNR/DNI       Chief Complaint   Patient presents with     Review Of Multiple Medical Conditions     LT 3/30/18.       HPI:   Bobbi is a 89 y.o. female resident in LTC at [facility] seen for routine physician follow up. She has significant dementia, non communicative. Hx of paranoia and psychosis, on behavioral meds. She also has COPD, HTN, hypothyroidism, DJD.    No interim concerns since my last visit. No concerns from nursing staff who know her well. She has not been sick recently with cough cold or congestion. No fever or illness. Appetite per usual. No change in bowel or bladder habits. She requires total care and assist from staff.      Past Medical History:  Past Medical History:   Diagnosis Date     Alzheimer's disease      Blepharitis      Cataract      COPD (chronic obstructive pulmonary disease)      Dermatitis      Diverticulosis      Glaucoma      Hearing loss      Hip fracture, right      HTN (hypertension)      Hypothyroidism      Malignant neoplasm of skin      Osteoporosis      Pulmonary hypertension      Vitamin D deficiency        Medications:  Current Outpatient Prescriptions   Medication Sig     bisacodyl (DULCOLAX, BISACODYL,) 10 mg suppository Insert 10 mg into the rectum. Every 3 days as needed     divalproex (DEPAKOTE SPRINKLES) 125 mg capsule Take 375 mg by mouth 2 (two) times a day.     hydrocortisone (ANUSOL-HC) 2.5 % rectal cream Insert 1 application into the rectum 2 (two) times a day as needed for hemorrhoids.     levothyroxine (SYNTHROID, LEVOTHROID) 75 MCG tablet Take 75 mcg by mouth daily.     methadone (DOLOPHINE) 5 MG tablet Take 2.5 mg by mouth 3 (three) times a day.     oxyCODONE (ROXICODONE) 10 mg immediate release tablet Take 10 mg by mouth every hour as needed for pain.     oxyCODONE (ROXICODONE) 10 mg immediate release tablet Take 10 mg by mouth 2  (two) times a day.     PARoxetine (PAXIL) 30 MG tablet Take 30 mg by mouth daily.     polyethylene glycol (MIRALAX) 17 gram packet Take 17 g by mouth daily as needed.     QUEtiapine (SEROQUEL) 50 MG tablet Take 50 mg by mouth 3 (three) times a day.     senna-docusate (SENNOSIDES-DOCUSATE SODIUM) 8.6-50 mg tablet Take 2 tablets by mouth 2 (two) times a day.      zinc oxide (DESITIN) 13 % Crea Apply 1 application topically.       Physical Exam:   General: Patient is alert female, up in todd chair.  Vitals: Reviewed.  HEENT: Head is NCAT. Eyes show no injection or icterus. Nares negative. Oropharynx well hydrated.  Neck: Supple. No tenderness or adenopathy. No JVD.  Lungs: Clear bilaterally. No wheezes. Poor cooperation.  Cardiovascular: Regular rate and rhythm, normal S1, S2. Poss soft systolic murmur.  Abdomen: Soft, no tenderness on exam. Bowel sounds present. No guarding rebound or rigidity.  Extremities: No edema is noted.  Musculoskeletal: Age related degen changes.   Skin: No open areas.       Labs:  Lab Results   Component Value Date    TSH 1.91 10/11/2017       Assessment/Plan:  1. Dementia. Hx psychosis and delusions. Cont on Seroquel.  2. HTN. BPs satisfactory. Not on meds.  3. Hypothyroidism. Continue replacement. Last TSH in goal range.  4. DJD. General debility. She is on scheduled methadone and tylenol.       Overall she appears stable, no new orders today.          Electronically signed by: Karley Schmidt MD

## 2021-06-19 NOTE — PROGRESS NOTES
"[facility]    Facility:   [facility] [[id number]]   Code Status: DNR      CHIEF COMPLAINT/REASON FOR VISIT:  Chief Complaint   Patient presents with     Problem Visit     scalp abrasion with bleeding        HISTORY:      HPI: Bobbi is a 89 y.o. female resident in LTC at [facility] seen today for reports of scalp bleeding  . She has been a patient here for a long time. She does have a HX of being on hospice but  Is no longer.  She is non communicative most of the time but today she was able to tell me her first and last name.  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 She had a small amount of blood on her pillow. She was found to have a large skin tear/abrasion posterior scalp with a boggy center. She is unable to tell  me if she having any pain or headaches. It appears it was noticed by the day PCA just coming on. I spoke with staff and it was an unwitnessed injury. There was no blood anywhere else in the room and uncertain as to how this happened. SHe was also noted to have a bruise/hematoma right posterior forearm. She did yell out a couple of times \" help me\" in which she has done this before. She is not able to verbalize what she needs help with or if she is having pain.Nursing did have an ice pack on her scalp and treated her with pain medication. She was sent to the ER for further evaluation due to this being an unwitnessed injury and unsure of the force.     Past Medical History:   Diagnosis Date     Alzheimer's disease      Blepharitis      Cataract      COPD (chronic obstructive pulmonary disease) (H)      Dermatitis      Diverticulosis      Glaucoma      Hearing loss      Hip fracture, right (H)      HTN (hypertension)      Hypothyroidism      Malignant neoplasm of skin      Osteoporosis      Pulmonary hypertension      Vitamin D deficiency              Family History   Problem Relation Age of Onset     Heart " "disease Father      Diabetes Sister      Hypertension Brother      MI, CVA     Social History     Social History     Marital status: Single     Spouse name: N/A     Number of children: N/A     Years of education: N/A     Social History Main Topics     Smoking status: Former Smoker     Smokeless tobacco: Never Used     Alcohol use No     Drug use: No     Sexual activity: Not on file     Other Topics Concern     Not on file     Social History Narrative     No narrative on file     Obtained from staff and chart. Pt with dementia    Review of Systems   Constitutional: Negative for appetite change, chills, fatigue and fever.   Respiratory: Negative for cough, shortness of breath and wheezing.    Cardiovascular: Negative for chest pain and leg swelling.   Gastrointestinal: Negative for abdominal distention, diarrhea and nausea.   Genitourinary: Negative for dysuria.   Musculoskeletal: Negative for arthralgias and myalgias.   Skin: Positive for wound. Negative for color change and rash.   Psychiatric/Behavioral: Positive for confusion. Negative for sleep disturbance.       .  Vitals:    07/02/18 2033   BP: 118/70   Pulse: 82   Resp: 20   Temp: 98.9  F (37.2  C)   SpO2: 95%       Physical Exam   Constitutional: She appears well-developed.   She did not appear to be in acute distress however she did shout out \"help me\" in which she done in the past.    HENT:   Head: Normocephalic.   Eyes: Conjunctivae are normal.   Neck: Normal range of motion.   Cardiovascular: Normal rate, regular rhythm and normal heart sounds.    No murmur heard.  Pulmonary/Chest: Breath sounds normal. No respiratory distress. She has no wheezes. She has no rales.   Abdominal: Soft. Bowel sounds are normal. She exhibits no distension. There is no tenderness.   Musculoskeletal: Normal range of motion. She exhibits no edema.   Skin: Skin is warm and dry.   Large skin tear/abrasion posterior scalp with a boggy center   Psychiatric: She has a normal mood and " affect. Her behavior is normal.         LABS:   No results found for this or any previous visit (from the past 240 hour(s)).  10/11/17-TSH-1.91    Current Outpatient Prescriptions   Medication Sig     bisacodyl (DULCOLAX, BISACODYL,) 10 mg suppository Insert 10 mg into the rectum. Every 3 days as needed     divalproex (DEPAKOTE SPRINKLES) 125 mg capsule Take 375 mg by mouth 2 (two) times a day.     hydrocortisone (ANUSOL-HC) 2.5 % rectal cream Insert 1 application into the rectum 2 (two) times a day as needed for hemorrhoids.     levothyroxine (SYNTHROID, LEVOTHROID) 75 MCG tablet Take 75 mcg by mouth daily.     methadone (DOLOPHINE) 5 MG tablet Take 2.5 mg by mouth 3 (three) times a day.     oxyCODONE (ROXICODONE) 10 mg immediate release tablet Take 10 mg by mouth every hour as needed for pain.     oxyCODONE (ROXICODONE) 10 mg immediate release tablet Take 10 mg by mouth 2 (two) times a day.     oxyCODONE (ROXICODONE) 10 mg immediate release tablet TAKE 1 TAB BY MOUTH TWICE A DAY FOR MOD-SEVERE PAIN     PARoxetine (PAXIL) 30 MG tablet Take 30 mg by mouth daily.     polyethylene glycol (MIRALAX) 17 gram packet Take 17 g by mouth daily as needed.     QUEtiapine (SEROQUEL) 50 MG tablet Take 50 mg by mouth 3 (three) times a day.     senna-docusate (SENNOSIDES-DOCUSATE SODIUM) 8.6-50 mg tablet Take 2 tablets by mouth 2 (two) times a day. 2 tablets in the AM and 1 tablet in the evening     zinc oxide (DESITIN) 13 % Crea Apply 1 application topically.       ASSESSMENT:      ICD-10-CM    1. Scalp abrasion S00.01XA    2. Dementia F03.90    3. RA (rheumatoid arthritis) (H) M06.9    4. Essential hypertension I10    5. DJD (degenerative joint disease) M19.90        PLAN:    Dementia. with psychosis and delusions. Cont current meds, on Paxil, Depakote and Seroquel  HTN. BP's stable. Not on BP meds. Last /70 with a pulse of 82  Hypothyroidism. Cont levothyroxine Last TSH 1.91 on 10/11/17.  Daily warm water eye  cleaning  Scalp abrasion and right forearm hematoma-unwitnessed injury, sent to the ER for further evaluation      Electronically signed by: Ijeoma Chang CNP

## 2021-06-19 NOTE — PROGRESS NOTES
[facility]    Facility:   [facility] [place] [facility] [[id number]]   Code Status: DNR/DNI       Chief Complaint   Patient presents with     Review Of Multiple Medical Conditions     LT 7/31/18.       HPI:   Bobbi is a 89 y.o. female resident in LTC at [facility] seen for routine physician follow up. She has significant dementia, non communicative. Hx of paranoia and psychosis, on behavioral meds. She also has COPD, HTN, hypothyroidism, DJD.    She has been stable. No nursing concenrs. No open areas of skin. Weight stable. She has not been ill recently. She requires total assistance from staff. Psychosis treated with Depakote and Seroquel. Also on Paxil. Variable restlessness and agitation but within her range of behaviors.      Past Medical History:  Past Medical History:   Diagnosis Date     Alzheimer's disease      Blepharitis      Cataract      COPD (chronic obstructive pulmonary disease) (H)      Dermatitis      Diverticulosis      Glaucoma      Hearing loss      Hip fracture, right (H)      HTN (hypertension)      Hypothyroidism      Malignant neoplasm of skin      Osteoporosis      Pulmonary hypertension      Vitamin D deficiency        Medications:  Current Outpatient Prescriptions   Medication Sig     bisacodyl (DULCOLAX, BISACODYL,) 10 mg suppository Insert 10 mg into the rectum. Every 3 days as needed     divalproex (DEPAKOTE SPRINKLES) 125 mg capsule Take 375 mg by mouth 2 (two) times a day.     hydrocortisone (ANUSOL-HC) 2.5 % rectal cream Insert 1 application into the rectum 2 (two) times a day as needed for hemorrhoids.     levothyroxine (SYNTHROID, LEVOTHROID) 75 MCG tablet Take 75 mcg by mouth daily.     methadone (DOLOPHINE) 5 MG tablet Take 2.5 mg by mouth 3 (three) times a day.     oxyCODONE (ROXICODONE) 10 mg immediate release tablet Take 10 mg by mouth every hour as needed for pain.     oxyCODONE (ROXICODONE) 10 mg immediate release tablet Take 10  mg by mouth 2 (two) times a day.     oxyCODONE (ROXICODONE) 10 mg immediate release tablet TAKE 1 TAB BY MOUTH TWICE A DAY FOR MOD-SEVERE PAIN     PARoxetine (PAXIL) 30 MG tablet Take 30 mg by mouth daily.     polyethylene glycol (MIRALAX) 17 gram packet Take 17 g by mouth daily as needed.     QUEtiapine (SEROQUEL) 50 MG tablet Take 50 mg by mouth 3 (three) times a day.     senna-docusate (SENNOSIDES-DOCUSATE SODIUM) 8.6-50 mg tablet Take 2 tablets by mouth 2 (two) times a day. 2 tablets in the AM and 1 tablet in the evening     zinc oxide (DESITIN) 13 % Crea Apply 1 application topically.       Physical Exam:   General: Patient is alert female, up in todd chair.  Vitals: Reviewed at facility.  HEENT: Head is NCAT. Eyes show no injection or icterus. Nares negative. Oropharynx well hydrated.  Neck: Supple. No tenderness or adenopathy. No JVD.  Lungs: Clear bilaterally. No wheezes. Poor cooperation.  Cardiovascular: Regular rate and rhythm, normal S1, S2.  Abdomen: Soft, no tenderness on exam. Bowel sounds present. No guarding rebound or rigidity.  Extremities: No edema is noted.  Musculoskeletal: Age related degen changes.   Skin: No open areas.       Labs:  Lab Results   Component Value Date    TSH 1.91 10/11/2017         Assessment/Plan:  1. Dementia. Hx delusions, psychosis. Stable on current Seroquel. Also on Depakote for mood disorder.  2. HTN. Stable. Not on BP meds.  3. Hypothyroidism. On replacement. Last TSH within range.   4. DJD. On scheduled methadone and tylenol.        Electronically signed by: Karley Schmidt MD

## 2021-06-20 NOTE — PROGRESS NOTES
[facility]    Facility:   [facility] [[id number]]   Code Status: DNR      CHIEF COMPLAINT/REASON FOR VISIT:  Chief Complaint   Patient presents with     Review Of Multiple Medical Conditions       HISTORY:      HPI: Bobbi is a 89 y.o. female resident in LTC at [facility] seen today for reports of scalp bleeding  . She has been a patient here for a long time. She does have a HX of being on hospice but  Is no longer.  She is non communicative most of the time . Today she did not verbalize anything and slept through the exam. 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 She  watts a history of a recent posterior scalp laceration in which she needed staples. The staples have been out for quite some time but she is still noted to have a intact scab in place with no redness or drainage.    Staff had no concerns except for poor po intake. She is on supplements.             Family History   Problem Relation Age of Onset     Heart disease Father      Diabetes Sister      Hypertension Brother      MI, CVA     Social History     Social History     Marital status: Single     Spouse name: N/A     Number of children: N/A     Years of education: N/A     Social History Main Topics     Smoking status: Former Smoker     Smokeless tobacco: Never Used     Alcohol use No     Drug use: No     Sexual activity: Not on file     Other Topics Concern     Not on file     Social History Narrative     No narrative on file     Obtained from staff and chart. Pt with dementia    Review of Systems   Constitutional: Negative for appetite change, chills, fatigue and fever.   Respiratory: Negative for cough, shortness of breath and wheezing.    Cardiovascular: Negative for chest pain and leg swelling.   Gastrointestinal: Negative for abdominal distention, diarrhea and nausea.   Genitourinary: Negative for dysuria.    Musculoskeletal: Negative for arthralgias and myalgias.   Skin: Positive for wound. Negative for color change and rash.   Psychiatric/Behavioral: Positive for confusion. Negative for sleep disturbance.       .  Vitals:    10/03/18 1037   BP: 112/70   Pulse: 68   Resp: 18   Temp: 98.6  F (37  C)   SpO2: 96%   Weight: 110 lb (49.9 kg)       Physical Exam   Constitutional: She appears well-developed.   Pleasant woman who slept through exam in no acute distress.   HENT:   Head: Normocephalic.   Eyes: Conjunctivae are normal.   Neck: Normal range of motion.   Cardiovascular: Normal rate, regular rhythm and normal heart sounds.    No murmur heard.  Pulmonary/Chest: Breath sounds normal. No respiratory distress. She has no wheezes. She has no rales.   Abdominal: Soft. Bowel sounds are normal. She exhibits no distension. There is no tenderness.   Musculoskeletal: Normal range of motion. She exhibits no edema.   Skin: Skin is warm and dry.   Posterior scab- no redness from an old laceration.   Psychiatric: She has a normal mood and affect. Her behavior is normal.         LABS:   TSH ordered for medication monitoring   No results found for this or any previous visit (from the past 240 hour(s)).      Current Outpatient Prescriptions   Medication Sig     bisacodyl (DULCOLAX, BISACODYL,) 10 mg suppository Insert 10 mg into the rectum. Every 3 days as needed     divalproex (DEPAKOTE SPRINKLES) 125 mg capsule Take 375 mg by mouth 2 (two) times a day.     hydrocortisone (ANUSOL-HC) 2.5 % rectal cream Insert 1 application into the rectum 2 (two) times a day as needed for hemorrhoids.     levothyroxine (SYNTHROID, LEVOTHROID) 75 MCG tablet Take 75 mcg by mouth daily.     methadone (DOLOPHINE) 5 MG tablet Take 2.5 mg by mouth 3 (three) times a day.     oxyCODONE (ROXICODONE) 10 mg immediate release tablet Take 10 mg by mouth every hour as needed for pain.     oxyCODONE (ROXICODONE) 10 mg immediate release tablet Take 10 mg by mouth  2 (two) times a day.     PARoxetine (PAXIL) 30 MG tablet Take 30 mg by mouth daily.     polyethylene glycol (MIRALAX) 17 gram packet Take 17 g by mouth daily as needed.     QUEtiapine (SEROQUEL) 50 MG tablet Take 50 mg by mouth 3 (three) times a day.     senna-docusate (SENNOSIDES-DOCUSATE SODIUM) 8.6-50 mg tablet Take 2 tablets by mouth 2 (two) times a day. 2 tablets in the AM and 1 tablet in the evening     zinc oxide (DESITIN) 13 % Crea Apply 1 application topically.       ASSESSMENT:      ICD-10-CM    1. Dementia F03.90    2. Essential hypertension I10    3. Hypothyroidism E03.9    4. RA (rheumatoid arthritis) (H) M06.9        PLAN:    Dementia. with psychosis and delusions. Cont current meds, on Paxil, Depakote and Seroquel  HTN. BP's stable. Not on BP meds. Last /70 with a pulse of 68  Hypothyroidism. Cont levothyroxine TSH ordered for medication monitoring        Electronically signed by: Ijeoma Chang, CNP

## 2021-06-20 NOTE — PROGRESS NOTES
[facility]    Facility[facility]   [facility] [[id number]]   Code Status: DNR      CHIEF COMPLAINT/REASON FOR VISIT:  Chief Complaint   Patient presents with     Problem Visit     retained staple       HISTORY:      HPI: Bobbi is a 89 y.o. female resident in LTC at [facility] seen today for reports of scalp bleeding  . She has been a patient here for a long time. She does have a HX of being on hospice but  Is no longer.  She is non communicative most of the time but today she was able to tell me her first and last name.  .She is a peyton lift transfer     Today she was asked to be seen for a retained staple that was found posterior scalp.  He was seen in her room sitting up in a wheelchair. The staple was identified and I was able to remove it. There was scant bleeding at the site and the surrounding site was pinkish. She was placed on   low dose prophylaxis keflex. She tolerated it well and actually slept through it. Her weights were reviewed and she was noted to be up and down 2 pounds over the  last month. Nursing had no further concerns.   Past Medical History:   Diagnosis Date     Alzheimer's disease      Blepharitis      Cataract      COPD (chronic obstructive pulmonary disease) (H)      Dermatitis      Diverticulosis      Glaucoma      Hearing loss      Hip fracture, right (H)      HTN (hypertension)      Hypothyroidism      Malignant neoplasm of skin      Osteoporosis      Pulmonary hypertension      Vitamin D deficiency              Family History   Problem Relation Age of Onset     Heart disease Father      Diabetes Sister      Hypertension Brother      MI, CVA     Social History     Social History     Marital status: Single     Spouse name: N/A     Number of children: N/A     Years of education: N/A     Social History Main Topics     Smoking status: Former Smoker     Smokeless tobacco: Never Used     Alcohol use No     Drug use: No     Sexual activity:  Not on file     Other Topics Concern     Not on file     Social History Narrative     No narrative on file     Obtained from staff and chart. Pt with dementia    Review of Systems   Constitutional: Negative for appetite change, chills, fatigue and fever.   Respiratory: Negative for cough, shortness of breath and wheezing.    Cardiovascular: Negative for chest pain and leg swelling.   Gastrointestinal: Negative for abdominal distention, diarrhea and nausea.   Genitourinary: Negative for dysuria.   Musculoskeletal: Negative for arthralgias and myalgias.   Skin: Positive for wound. Negative for color change and rash.   Psychiatric/Behavioral: Positive for confusion. Negative for sleep disturbance.       .  Vitals:    08/22/18 2157   BP: 118/67   Pulse: 67   Resp: 18   Temp: 97.6  F (36.4  C)   SpO2: 96%   Weight: 117 lb (53.1 kg)       Physical Exam   Constitutional: She appears well-developed.   Pleasant woman who slept through having a retained suture removed.    HENT:   Head: Normocephalic.   Eyes: Conjunctivae are normal.   Neck: Normal range of motion.   Cardiovascular: Normal rate, regular rhythm and normal heart sounds.    No murmur heard.  Pulmonary/Chest: Breath sounds normal. No respiratory distress. She has no wheezes. She has no rales.   Abdominal: Soft. Bowel sounds are normal. She exhibits no distension. There is no tenderness.   Musculoskeletal: Normal range of motion. She exhibits no edema.   Skin: Skin is warm and dry.   Retained staple posterior scalp   Psychiatric: She has a normal mood and affect. Her behavior is normal.         LABS:   No results found for this or any previous visit (from the past 240 hour(s)).      Current Outpatient Prescriptions   Medication Sig     bisacodyl (DULCOLAX, BISACODYL,) 10 mg suppository Insert 10 mg into the rectum. Every 3 days as needed     divalproex (DEPAKOTE SPRINKLES) 125 mg capsule Take 375 mg by mouth 2 (two) times a day.     hydrocortisone (ANUSOL-HC) 2.5 %  rectal cream Insert 1 application into the rectum 2 (two) times a day as needed for hemorrhoids.     levothyroxine (SYNTHROID, LEVOTHROID) 75 MCG tablet Take 75 mcg by mouth daily.     methadone (DOLOPHINE) 5 MG tablet Take 2.5 mg by mouth 3 (three) times a day.     oxyCODONE (ROXICODONE) 10 mg immediate release tablet Take 10 mg by mouth every hour as needed for pain.     oxyCODONE (ROXICODONE) 10 mg immediate release tablet Take 10 mg by mouth 2 (two) times a day.     oxyCODONE (ROXICODONE) 10 mg immediate release tablet TAKE 1 TAB BY MOUTH TWICE A DAY FOR MOD-SEVERE PAIN     PARoxetine (PAXIL) 30 MG tablet Take 30 mg by mouth daily.     polyethylene glycol (MIRALAX) 17 gram packet Take 17 g by mouth daily as needed.     QUEtiapine (SEROQUEL) 50 MG tablet Take 50 mg by mouth 3 (three) times a day.     senna-docusate (SENNOSIDES-DOCUSATE SODIUM) 8.6-50 mg tablet Take 2 tablets by mouth 2 (two) times a day. 2 tablets in the AM and 1 tablet in the evening     zinc oxide (DESITIN) 13 % Crea Apply 1 application topically.       ASSESSMENT:      ICD-10-CM    1. Dementia F03.90    2. Removal of staple Z48.02    3. Essential hypertension I10    4. RA (rheumatoid arthritis) (H) M06.9        PLAN:    Dementia. with psychosis and delusions. Cont current meds, on Paxil, Depakote and Seroquel  HTN. BP's stable. Not on BP meds. Last /67 with a pulse of 67  Hypothyroidism. Cont levothyroxine  Retained staple in scalp- removed and placed on prophylaxis  Low dose keflex       Electronically signed by: Ijeoma Chang, CNP